# Patient Record
Sex: FEMALE | Race: WHITE | HISPANIC OR LATINO | URBAN - METROPOLITAN AREA
[De-identification: names, ages, dates, MRNs, and addresses within clinical notes are randomized per-mention and may not be internally consistent; named-entity substitution may affect disease eponyms.]

---

## 2023-04-12 PROBLEM — F41.1 GENERALIZED ANXIETY DISORDER: Status: ACTIVE | Noted: 2017-11-08

## 2023-04-12 PROBLEM — Z60.3 LANGUAGE BARRIER AFFECTING HEALTH CARE: Status: ACTIVE | Noted: 2022-03-24

## 2023-04-12 PROBLEM — Z78.9 LANGUAGE BARRIER AFFECTING HEALTH CARE: Status: ACTIVE | Noted: 2022-03-24

## 2023-04-12 PROBLEM — Z75.8 LANGUAGE BARRIER AFFECTING HEALTH CARE: Status: ACTIVE | Noted: 2022-03-24

## 2023-05-19 ENCOUNTER — OFFICE VISIT (OUTPATIENT)
Dept: OBGYN CLINIC | Facility: CLINIC | Age: 58
End: 2023-05-19

## 2023-05-19 VITALS
DIASTOLIC BLOOD PRESSURE: 78 MMHG | WEIGHT: 175.4 LBS | SYSTOLIC BLOOD PRESSURE: 113 MMHG | HEIGHT: 64 IN | BODY MASS INDEX: 29.94 KG/M2 | HEART RATE: 59 BPM

## 2023-05-19 DIAGNOSIS — Z13.31 POSITIVE SCREENING FOR DEPRESSION ON 9-ITEM PATIENT HEALTH QUESTIONNAIRE (PHQ-9): Primary | ICD-10-CM

## 2023-05-19 NOTE — PROGRESS NOTES
"[address] [facility] [address], [address] [facility] [address]  Phone#  [phone]  Fax#  [phone] [address] VISIT      Subjective     Alla Morris is a 59 y o  female here for discussion of surgery ans pessary replacement  Patient is currently being follow and managed by [facility] due to uterovaginal prolapse, cystocele, rectocele and stress incontinence, patient has been schedule for robotic asissted supracervical hysterectomy, BSO, anterior colporrhaphy , possible perineorrhaphy, sacrocolpopexy, retropubic sling, cysto next 8/7/2023  She presented today for surgery discussion and possible pessary placement  Patient states she was informed to removed her pessary ans she is wondering if her pessary can be placed at this time  Personal health questionnaire reviewed: yes  Obstetric History  OB History   No obstetric history on file  The following portions of the patient's history were reviewed and updated as appropriate: allergies, current medications, past social history and problem list     Review of Systems  Pertinent items are noted in HPI        Objective     /78 (BP Location: Right arm, Patient Position: Sitting, Cuff Size: Standard)   Pulse 59   Ht 5' 4\" (1 626 m)   Wt 79 6 kg (175 lb 6 4 oz)   BMI 30 11 kg/m²     General Appearance:    Alert, cooperative, no distress, appears stated age   Head:    Normocephalic, without obvious abnormality, atraumatic   Eyes:    PERRL, conjunctiva/corneas clear, EOM's intact, fundi     benign, both eyes   Ears:    Normal TM's and external ear canals, both ears   Nose:   Nares normal, septum midline, mucosa normal, no drainage    or sinus tenderness   Throat:   Lips, mucosa, and tongue normal; teeth and gums normal   Neck:   Supple, symmetrical, trachea midline, no adenopathy;     thyroid:  no enlargement/tenderness/nodules; no carotid    bruit or JVD   Back:     Symmetric, no " "curvature, ROM normal, no CVA tenderness   Lungs:     Clear to auscultation bilaterally, respirations unlabored   Chest Wall:    No tenderness or deformity    Heart:    Regular rate and rhythm, S1 and S2 normal, no murmur, rub   or gallop       Abdomen:     Soft, non-tender, bowel sounds active all four quadrants,     no masses, no organomegaly           Extremities:   Extremities normal, atraumatic, no cyanosis or edema   Pulses:   2+ and symmetric all extremities   Skin:   Skin color, texture, turgor normal, no rashes or lesions   Lymph nodes:   Cervical, supraclavicular, and axillary nodes normal   Neurologic:   CNII-XII intact, normal strength, sensation and reflexes     throughout         Assessment/Plan    Patient presented today for surgery discussion and possible pessary placement  I have discussed with patient we are not the surgical group that is planning her upcoming surgery so we are unable to answer surgery details and surgical planning  In addition I explained patient I would prefer to defer her pessary care to the group that was instructed her to removed it at [facility]  I have explained patient were different surgical groups  Patient had the idea she was coming to her usual surgical team  I have asked patient is she was aware we were different teams and she denies  A language barrier is present and patient states she has some difficulties understanding  I have asked patient to call her daughter and I personally explained the situation to her family member  She will schedule a follow up with her surgical team  I have suggested patient to go to her presurgical evaluation with her daughter but patient turned tearful and stay she \"cant count with her\" I have encourage patient to request translation services in each medical encounter to avoid misunderstanding  Complete encounter was with a fluent Dutch speaker provider  All questions answered to her satisfaction  D/w Dr Alicia Angel " Ortega Underwood MD

## 2023-05-23 ENCOUNTER — PATIENT OUTREACH (OUTPATIENT)
Dept: OBGYN CLINIC | Facility: CLINIC | Age: 58
End: 2023-05-23

## 2023-05-23 NOTE — PROGRESS NOTES
JESSICA MANLEY spoke with 60 y/o-S-  Turkish speaking woman to address high depression score  Pt resides wih her daughter and 2 grand daughters  Pt denies any usage of drug, alcohol or smoking  Pt reported Hx of depression  Last time on treatment was 2020  Pt reported she called [facility] and have an intake appointment next months  Pt denies any SI  /HI  Pt reported her main issues her coming up surgery in August  Pt is employed full time through a staff agency and does not have PTO or disability benefits  Pt states she does not have any other income and is trying to find help to cover her expenses for 2 months  Pt reported she was told that unemployment may pay her for 2 months  Pt was advice to call [facility] to inquire about that  JESSICA MANLEY suggested to call her lenders to explain her situation and inquire about deferment or other assistance to hold on her payments  Pt was also encouraged to apply for SNAP during the recuperation time as well as [employer]    Pt agreeable  Pt claimed her main support is her daughter  JESSICA MANLEY provided supportive counseling and encouraged Pt to call at any time needed

## 2025-08-01 ENCOUNTER — HOSPITAL ENCOUNTER (EMERGENCY)
Facility: HOSPITAL | Age: 60
Discharge: HOME | End: 2025-08-01
Attending: EMERGENCY MEDICINE

## 2025-08-01 ENCOUNTER — APPOINTMENT (EMERGENCY)
Dept: RADIOLOGY | Facility: HOSPITAL | Age: 60
End: 2025-08-01